# Patient Record
Sex: MALE | Race: WHITE | NOT HISPANIC OR LATINO
[De-identification: names, ages, dates, MRNs, and addresses within clinical notes are randomized per-mention and may not be internally consistent; named-entity substitution may affect disease eponyms.]

---

## 2022-05-12 ENCOUNTER — APPOINTMENT (OUTPATIENT)
Dept: ORTHOPEDIC SURGERY | Facility: CLINIC | Age: 57
End: 2022-05-12
Payer: COMMERCIAL

## 2022-05-12 VITALS — HEIGHT: 72 IN | BODY MASS INDEX: 25.06 KG/M2 | WEIGHT: 185 LBS

## 2022-05-12 DIAGNOSIS — M54.50 LOW BACK PAIN, UNSPECIFIED: ICD-10-CM

## 2022-05-12 DIAGNOSIS — Z78.9 OTHER SPECIFIED HEALTH STATUS: ICD-10-CM

## 2022-05-12 DIAGNOSIS — M54.12 RADICULOPATHY, CERVICAL REGION: ICD-10-CM

## 2022-05-12 DIAGNOSIS — Z00.00 ENCOUNTER FOR GENERAL ADULT MEDICAL EXAMINATION W/OUT ABNORMAL FINDINGS: ICD-10-CM

## 2022-05-12 DIAGNOSIS — M54.9 DORSALGIA, UNSPECIFIED: ICD-10-CM

## 2022-05-12 DIAGNOSIS — G89.29 DORSALGIA, UNSPECIFIED: ICD-10-CM

## 2022-05-12 DIAGNOSIS — M54.2 CERVICALGIA: ICD-10-CM

## 2022-05-12 DIAGNOSIS — G89.29 LOW BACK PAIN, UNSPECIFIED: ICD-10-CM

## 2022-05-12 PROCEDURE — 72040 X-RAY EXAM NECK SPINE 2-3 VW: CPT

## 2022-05-12 PROCEDURE — 99204 OFFICE O/P NEW MOD 45 MIN: CPT

## 2022-05-12 RX ORDER — CYCLOBENZAPRINE HCL 100 %
POWDER (GRAM) MISCELLANEOUS
Refills: 0 | Status: ACTIVE | COMMUNITY

## 2022-05-12 RX ORDER — IBUPROFEN 800 MG/1
800 TABLET, FILM COATED ORAL
Qty: 90 | Refills: 0 | Status: ACTIVE | COMMUNITY
Start: 2022-05-12 | End: 1900-01-01

## 2022-05-12 RX ORDER — ACETAMINOPHEN AND CODEINE PHOSPHATE 300; 30 MG/1; MG/1
300-30 TABLET ORAL
Refills: 0 | Status: ACTIVE | COMMUNITY

## 2022-05-12 NOTE — REASON FOR VISIT
[Initial Visit] : an initial visit for [Degenerative Joint Disease] : degenerative joint disease [Back Pain] : back pain [Neck Pain] : neck pain [Radiculopathy] : radiculopathy [Family Member] : family member

## 2022-05-12 NOTE — DISCUSSION/SUMMARY
[Medication Risks Reviewed] : Medication risks reviewed [de-identified] : He will stop the physical therapy as I agree with him it may be aggravating his symptoms.  We discussed daily activities he needs to avoid which may well be aggravating and propagating the symptoms.  He will rest and use moist heat.  I have increased the ibuprofen to 800 mg 3 times a day.  He will call if there are problems with the medication or worsening of his symptoms and I will see him for follow-up in 3 weeks.

## 2022-05-12 NOTE — HISTORY OF PRESENT ILLNESS
[Pain Location] : pain [Worsening] : worsening [10] : a maximum pain level of 10/10 [de-identified] : This 56-year-old male is seen in the office today for neck and upper back and left arm pain.  He started with symptoms of intermittent lower back pain in his 20s.  He started with symptoms of on and off upper back pain in roughly 2008 or 2009.  He had a course of physical therapy in 2018 which seemed to help the symptoms.  Recently they adopted a dog and with training the dog and the dog pulling on him he had a recurrence of neck and upper back pain in mid March.  The neck and upper back pain is graded as a 10.  There is some radiation to the left upper extremity that he grades as a 7 or an 8.  The pain is worse at night.  He has had some occasional numbness in the left arm but denies paresthesias.  The pain can be worse with coughing, sneezing and forcing to move his bowels.  There have been no changes in his gait or balance.  He had 5 days of prednisone from his primary care physician and has been taking Flexeril and oxycodone.  He has been taking ibuprofen 400 mg 3 times a day and Tylenol.  He has been attending physical therapy again but thinks it may be aggravating his symptoms.\par \par He had gallbladder surgery in his 20s and has had some ongoing lower abdominal symptoms since then.  He takes multiple supplements.

## 2022-05-12 NOTE — PHYSICAL EXAM
[de-identified] : He is fully alert and oriented with a normal mood and affect.  He is in no acute distress as I take the history.  He ambulates with a normal gait including tiptoe and heel walking.  There is no evidence of unsteadiness or spasticity.  There are no cutaneous abnormalities or palpable bony defects of the spine.  There is no evidence of shortness of breath or respiratory distress.  There is no paravertebral muscle spasm, sciatic notch tenderness or trochanteric tenderness.  In stance evaluation there is a mild elevation of the left shoulder with a level pelvis.  Forward flexion of the spine is painless to 70 degrees.  His lower extremity neurological examination revealed 1-2+ symmetrical reflexes.  Toes are mute.  Motor power is normal in manual testing in all lower extremity groups and sensation is normal to light touch in all dermatomes.  Straight leg raising is negative to 90 degrees in the sitting position bilaterally.  His hips and knees have a full and painless range of motion with normal stability.  Vascular examination shows no evidence of varicosities and there is no lymphedema.  There are no cutaneous abnormalities of the upper or lower extremities.  His upper extremity neurological examination revealed trace symmetrical biceps reflexes bilaterally with reinforcement.  Brachioradialis and triceps reflexes are 1+ and symmetrical.  Motor power is normal in manual testing in all upper extremity groups and sensation is normal to light touch in all dermatomes.  Shoulder range of motion is full bilaterally with some discomfort at the extremes of motion on the left.  Range of motion of the cervical spine showed flexion with the chin reaching to within 1 fingerbreadth of the chest, extension of 50 degrees with discomfort.  Rotation of 70 degrees bilaterally with discomfort and tilt of 25 degrees bilaterally with discomfort. [de-identified] : In light of his complaints I obtained AP and lateral x-rays of the cervical spine.  He has multilevel degenerative changes.  There is some mild disc space narrowing at the C4-5 level and more so at C5-6 and C6-7.  There is a reversal of the normal cervical lordosis.  He has a very minimal scoliosis.  There are no destructive changes.